# Patient Record
Sex: MALE | Race: ASIAN | HISPANIC OR LATINO | Employment: OTHER | URBAN - METROPOLITAN AREA
[De-identification: names, ages, dates, MRNs, and addresses within clinical notes are randomized per-mention and may not be internally consistent; named-entity substitution may affect disease eponyms.]

---

## 2023-08-08 ENCOUNTER — OFFICE VISIT (OUTPATIENT)
Dept: DERMATOLOGY | Facility: CLINIC | Age: 68
End: 2023-08-08
Payer: COMMERCIAL

## 2023-08-08 VITALS — BODY MASS INDEX: 24.85 KG/M2 | TEMPERATURE: 96.7 F | HEIGHT: 70 IN | WEIGHT: 173.6 LBS

## 2023-08-08 DIAGNOSIS — L20.9 ATOPIC DERMATITIS, UNSPECIFIED TYPE: Primary | ICD-10-CM

## 2023-08-08 DIAGNOSIS — L21.9 SEBORRHEIC DERMATITIS: ICD-10-CM

## 2023-08-08 PROCEDURE — 99204 OFFICE O/P NEW MOD 45 MIN: CPT | Performed by: DERMATOLOGY

## 2023-08-08 RX ORDER — KETOCONAZOLE 20 MG/ML
SHAMPOO TOPICAL
Qty: 120 ML | Refills: 4 | Status: SHIPPED | OUTPATIENT
Start: 2023-08-08 | End: 2023-08-09

## 2023-08-08 RX ORDER — METOPROLOL SUCCINATE 25 MG/1
TABLET, EXTENDED RELEASE ORAL
COMMUNITY
Start: 2023-05-18

## 2023-08-08 RX ORDER — LORATADINE 10 MG/1
10 TABLET ORAL AS NEEDED
COMMUNITY

## 2023-08-08 RX ORDER — LISINOPRIL 20 MG/1
TABLET ORAL
COMMUNITY
Start: 2023-05-18

## 2023-08-08 RX ORDER — FLUOCINONIDE TOPICAL SOLUTION USP, 0.05% 0.5 MG/ML
SOLUTION TOPICAL
Qty: 60 ML | Refills: 4 | Status: SHIPPED | OUTPATIENT
Start: 2023-08-08 | End: 2023-08-09

## 2023-08-08 RX ORDER — CICLOPIROX 1 G/100ML
SHAMPOO TOPICAL
Qty: 120 ML | Refills: 4 | Status: SHIPPED | OUTPATIENT
Start: 2023-08-08 | End: 2023-08-09

## 2023-08-08 RX ORDER — CLOBETASOL PROPIONATE 0.5 MG/G
CREAM TOPICAL
Qty: 60 G | Refills: 3 | Status: SHIPPED | OUTPATIENT
Start: 2023-08-08 | End: 2023-08-09

## 2023-08-08 NOTE — PATIENT INSTRUCTIONS
ATOPIC DERMATITIS ("childhood Eczema")    Assessment and Plan:  Based on a thorough discussion of this condition and the management approach to it (including a comprehensive discussion of the known risks, side effects and potential benefits of treatment), the patient (family) agrees to implement the following specific plan:  Continue clobetasol 0.05% cream use when flaring for areas on body not on face apply topically 2 times a day for no more than 2 weeks. Continue Hydrocortisone 2.5% cream as needed. Continue Vaseline applying daily. Recommend establishing with PCP for routine blood work. Recommend taking Claritin on a daily basis. Recommend Ophthmologist due to long issue of steroid use on the scalp. Assessment and Plan:   Atopic Dermatitis is a chronic, itchy skin condition that is very common in children but may occur at any age. It is also known as “eczema” or “atopic eczema.” It is the most common form of dermatitis. Atopic dermatitis usually occurs in people who have an “atopic tendency.”  This means they may develop any or all of these closely linked conditions: Atopic dermatitis, asthma, hay fever (allergic rhinitis), eosinophilic esophagitis, and gastroenteritis. Often these conditions run within families with a parent, child or sibling also affected. A family history of asthma, eczema or hay fever is particularly useful in diagnosing atopic dermatitis in infants. Atopic dermatitis arises because of a complex interaction of genetic and environmental factors. These include defects in skin barrier function making the skin more susceptible to irritation by soap and other contact irritants, the weather, temperature and non-specific triggers. There is also an element of immune system dysregulation that is often present. By definition, it is chronic and has a "waxing-waning" nature; flares should be expected but with good education and treatment strategies can be minimized.     Some specific tips we discussed:  Dry skin care. Using only mild cleansers (hypoallergenic and without fragrances) and fragrance free detergent (not “unscented” products which contain a masking agent); we discussed avoiding irritants/fragranced products. The importance of regular application of moisturizers daily (at least 3 times a day)  The known and theoretical side effects of steroids at length, including but not limited to atrophy of skin and increased pressure in eye (glaucoma) and clouding of the eye's lens (cataracts) if used in or around the eye for extended durations. The specific over-the-counter interventions and medications. Side effects, risks and benefits of topical and oral medications discussed. After lengthy discussion of etiology and treatment options, we decided to implement the following personalized treatment plan:      EDUCATION AS INTERVENTION! WHAT IS ATOPIC DERMATITIS? Atopic dermatitis (also called “eczema”) is a condition of the skin where the skin is dry, red, and itchy. The main function of the skin is to provide a barrier from the environment and is also the first defense of the immune system. In atopic dermatitis the skin barrier is decreased or disrupted, and the skin is easily irritated. As a result, moisture escapes the skin more easily, and environmental allergens and microbes can enter the skin more easily. Consequently, the skin's immune system is altered. If there are increased allergic type cells in the skin, the skin may become red and “hyper-excitable.” This leads to itching and a subsequent rash. WHY DO PEOPLE GET ATOPIC DERMATITIS? There is no single answer because many factors are involved. It is likely a combination of genetic makeup and environmental triggers and/or exposures. Excessive drying or sweating of the skin, Irritating soaps, dust mites, and pet dander are some of the more common triggers.   There is no blood test that can be done to confirm this diagnosis. The history and appearance of the skin is usually sufficient for a diagnosis. However, in some cases if the rash does not fit with the history or respond appropriately to treatment, a skin biopsy may be helpful. Many children do outgrow atopic dermatitis or get better; however, many continue to have sensitive skin into adulthood. Asthma and hay fever are often seen in many patients with atopic dermatitis; however, asthma flares do not necessarily occur at the same time as skin flares. PREVENTING FLARES OF ATOPIC DERMATITIS  The first step is to maintain the skin's barrier function. Keep the skin well moisturized. Avoid irritants and triggers. Use prescribed medicine when there are red or rough areas to help the skin to return to normal as quickly as possible. Try to limit scratching. If you keep the skin well moisturized, and avoid coming in contact with things you know irritate your child's skin, there will be less flares. However, some flares of atopic dermatitis are beyond your control. You should work with your health care provider to come up with a plan that minimizes flares while minimizing long term use of medications that suppress the immune system. WHAT ARE SOME OF THE TRIGGERS? Triggers are different for different people. The most common triggers are:  Heat and sweat for some individuals, cold weather for others. House dust mites, pet fur. Wool; synthetic fabrics like nylon; dyed fabrics. Tobacco smoke   Fragrances in: shampoos, soaps, lotions, laundry detergents, fabric softeners. Saliva or prolonged exposure to water. WHAT ABOUT FOOD ALLERGIES? This is a very controversial topic, as many believe that food allergies are responsible for skin flares. In some cases, specific foods may cause worsening of atopic dermatitis; however this occurs in a minority of cases and usually happens within a few hours of ingestion.  While food allergy is more common in children with eczema, foods are specific triggers for flares in only a small percentage of children. If you notice that the skin flares after certain foods you can see if eliminating one food at time makes a difference, as long as your child can still enjoy a well-balanced diet. There are blood (RAST) and skin (PRICK) tests that can check for allergies, but they are often positive in children who are not truly allergic. Therefore it is important that you work with your allergist and dermatologist to determine which foods are relevant and causing true symptoms. Extreme food elimination diets without the guidance of your doctor, which have become more popular in recent years, may even result in worsening of the skin rash due to malnutrition and avoidance of essential nutrients. TREATMENT  Treatments are aimed at minimizing exposure to irritating factors and decreasing  the skin inflammation which results in an itchy rash. There are many different treatment options, which depend on your child's rash, its location, and severity. Topical treatments include corticosteroids and steroid-like creams such as Protopic, Elidel, and Eucrisa, which are believed to not thin the skin. Please read the discussions below regarding risks and benefits of all of these creams. Occasionally bacterial or viral infections can occur which flare the skin and require oral and/or topical antibiotics or antivirals. In some cases bleach baths 2-3 times weekly can be helpful to prevent recurrent infection. For severe disease, strong oral medications such as corticosteroids, methotrexate or azathioprine (Imuran) may be needed. These medications require close monitoring and follow-up. You should discuss the risks/ benefits/alternatives of these medications with your health care provider to come up with the best treatment plan for your child. 1) Use moisturizer all over the entire body at least THREE TIMES a day.   This keeps the skin moisturized to restore the barrier function. Find a cream or ointment that your child likes - this is the most important. The medicines do not work in the bottle. The thicker the moisturizer, generally the better barrier it provides. Ointments often moisturize better than creams; and creams work better than lotions. Lotions are more useful during the summer when thick greasy ointments are uncomfortable. If you put moisturizer on the skin after bathing, while the skin is damp, it is twice as effective. The moisturizer provides a seal holding the water in the skin. You may bathe your child in warm - not hot - water, for short periods of time (no more than 5-10 minutes at a time) once a day if they like. Lightly pat your child dry with a towel and, while the skin is still damp, (within 3 minutes) apply a moisturizer from head to toe. If your child is using a medicated cream, apply it and allow it to absorb completely BEFORE you apply the moisturizer. 2) Apply the prescription medication TWICE A DAY to only the red, [place] [id number] [address]  Put the medication on your fingers and gently rub it into the areas. Usually the medicine will help an area within a few days time. Try to put the medicine on for two days after you have noticed that the redness is no longer present; this will help the redness from returning. The severity of the rash and the strength and usage of the medication will determine how quickly you see improvement. It is important that you do not overuse steroid creams, and if you notice a thin, shiny appearance to the skin or broken blood vessels, you should stop using the cream and consult your health care provider regarding possible overuse/overthinning of the skin. The face, armpits and groin have particularly thin and sensitive skin and are therefore most at risk for bad results if steroids are over-used in these sites. 3) Avoid triggers.     Some children have specific things that trigger itching and rashes, while others may have none that can be identified. It may require a little bit of trial and error to see what applies to your child. Also, triggers can change over time for your child. The most common triggers are listed above; start with these. Avoid the use of fabric softeners in the washing machine or dryer sheets (unless they are fragrance-free). Try to use laundry detergents, soaps and shampoos that are fragrance-free. You may find it helpful to double-rinse your clothes. Some children are sensitive to house dust mites and they may benefit from a plastic mattress wrap. While food allergy is more common in children with eczema, foods are specific triggers for flares in only a small percentage of children. If you notice that the skin flares after certain foods you can see if eliminating one food at time makes a difference, as long as your child can still enjoy a well-balanced diet. 4) Consider using a medication like an anti-histamine by mouth to help control the itching. Scratching only makes the skin more reactive and the barrier function even more disrupted. It can cause both children and their parents to lose sleep! There are different types of anti-itch medications. Some cause more drowsiness than others. Both types are acceptable depending on your child and your preference. Start with Benadryl and if that does not work, ask for a prescription “antihistamine.”    5) About the prescription creams:  Corticosteroid creams and ointments (generally things with "-one" or "randy" on the end of their names): The strength of the cream or ointment depends on the name of the active ingredient. The numbers at the end do not indicate the relative strength. Thus triamcinolone 0.1% ointment, considered a mid-strength corticosteroid, is much stronger than hydrocortisone 1% even though the number following the name is much lower.   Topical corticosteroids are very effective in treating atopic dermatitis. When used in the manner prescribed (to rashy areas of skin and for no more than a few weeks at a time to any one area) they are very safe. These are corticosteroids and are anti-inflammatory, not the “anabolic steroids” like those used illegally by some athletes. Topical non-steroid creams and ointments (immunomodulators): These creams and ointments are also called topical calcineurin inhibitors (TCIs). These include Protopic ointment and Elidel cream. Crisaborole 2% [email or web address]) is a prescription ointment that targets an enzyme called PDE4 (phosphodiesterase 4). It is used on the skin topically to treat mild-to-moderate eczema in adults and children 3years of age and older. In total, these nonsteroidal prescriptions are used to help decrease itching and redness in the skin. They are not as strong as most steroid creams; however, it is believed that they do not thin the skin when overused. They are generally used as second-line medications, though they may be used alone or in conjunction with topical steroids. In sensitive areas such as the face, underarms or groin, they are often recommended. They can sting inflamed skin, but are generally well tolerated once the skin is healing. The FDA placed a “black-box” warning on both Elidel and Protopic in 2006 based on animal studies using the medications. Some animals developed skin cancer and lymphoma. Subsequently, the FDA released a statement that there is no causal relationship between the two medications and cancer. Because of this concern, there are ongoing studies to evaluate this relationship in humans. So far, there are studies that support the safety of these medications.   One showed that the rates of cancer in patient using these medications topically were less than the rates of the general population and another showed that in patient's using the medication over a large area of the body, the levels of the medication in the blood was undetectable. As for Eucrisa, this product is only approved for the topical treatment of mild-to-moderate eczema in patients 3years of age and older; use of the medication in kids younger than 2 is considered “off label” and has not been formally studied. Burning and stinging are the most commonly reported side effects of this medication. Rarely, this product has been known to cause hives and hypersensitivity reactions; discontinue its use if you develop severe itching, swelling, or redness in the area of application. SEBORRHEIC DERMATITIS    Assessment and Plan:  Based on a thorough discussion of this condition and the management approach to it (including a comprehensive discussion of the known risks, side effects and potential benefits of treatment), the patient (family) agrees to implement the following specific plan:  Recommend using Ketoconazole shampoo 3 times weekly  recommend switching ciclopirox shampoo 2-3 times weekly. Continue Fluocinonide solution as needed for the scalp      Seborrheic Dermatitis   Seborrheic dermatitis is a common, chronic or relapsing form of eczema/dermatitis that mainly affects the sebaceous, gland-rich regions of the scalp, face, and trunk. There are infantile and adult forms of seborrhoeic dermatitis. It is sometimes associated with psoriasis and, in that clinical scenario, may be referred to as "sebo-psoriasis."  Seborrheic dermatitis is also known as "seborrheic eczema."  Dandruff (also called "pityriasis capitis") is an uninflamed form of seborrhoeic dermatitis. Dandruff presents as bran-like scaly patches scattered within hair-bearing areas of the scalp. In an infant, this condition may be referred to as "cradle cap."  The cause of seborrheic dermatitis is not completely understood. It is associated with proliferation of various species of the skin commensal Malassezia, in its yeast (non-pathogenic) form.  Its metabolites (such as the fatty acids oleic acid, malssezin, and indole-3-carbaldehyde) may cause an inflammatory reaction. Differences in skin barrier lipid content and function may account for individual presentations. Infantile Seborrheic Dermatitis  Infantile seborrheic dermatitis affects babies under the age of 1 months and usually resolves by 1012 months of age. Infantile seborrheic dermatitis causes "cradle cap" (diffuse, greasy scaling on scalp). The rash may spread to affect armpit and groin folds (a type of "napkin dermatitis"). There may be associated salmon-pink colored patches that may flake or peel. The rash in this case is usually not especially itchy, so the baby often appears undisturbed by the rash, even when more generalized. Adult Seborrheic Dermatitis  Adult seborrheic dermatitis tends to begin in late adolescence; prevalence is greatest in young adults and in the elderly. It is more common in males than in females. The following factors are sometimes associated with severe adult seborrheic dermatitis:  Oily skin  Familial tendency to seborrhoeic dermatitis or a family history of psoriasis  Immunosuppression: organ transplant recipient, human immunodeficiency virus (HIV) infection and patients with lymphoma  Neurological and psychiatric diseases: Parkinson disease, tardive dyskinesia, depression, epilepsy, facial nerve palsy, spinal cord injury and congenital disorders such as Down syndrome  Treatment for psoriasis with psoralen and ultraviolet A (PUVA) therapy  Lack of sleep  Stressful events. In adults, seborrheic dermatitis may typically affect the scalp, face (creases around the nose, behind ears, within eyebrows) and upper trunk. Typical clinical features include:   Winter flares, improving in summer following sun exposure  Minimal itch most of the time  Combination oily and dry mid-facial skin  Ill-defined localized scaly patches or diffuse scale in the scalp  Blepharitis; scaly red eyelid margins  [place]-pink, thin, scaly, and ill-defined plaques in skin folds on both sides of the face  Petal or ring-shaped flaky patches on hair-line and on anterior chest  Rash in armpits, under the breasts, in the groin folds and genital creases  Superficial folliculitis (inflamed hair follicles) on cheeks and upper trunk    Seborrheic dermatitis is diagnosed by its clinical appearance and behavior. Skin biopsy may be helpful but is rarely necessary to make this diagnosis.

## 2023-08-09 RX ORDER — KETOCONAZOLE 20 MG/ML
SHAMPOO TOPICAL
Qty: 120 ML | Refills: 4 | Status: SHIPPED | OUTPATIENT
Start: 2023-08-09

## 2023-08-09 RX ORDER — CLOBETASOL PROPIONATE 0.5 MG/G
CREAM TOPICAL
Qty: 60 G | Refills: 3 | Status: SHIPPED | OUTPATIENT
Start: 2023-08-09

## 2023-08-09 RX ORDER — CICLOPIROX 1 G/100ML
SHAMPOO TOPICAL
Qty: 120 ML | Refills: 4 | Status: SHIPPED | OUTPATIENT
Start: 2023-08-09

## 2023-08-09 RX ORDER — FLUOCINONIDE TOPICAL SOLUTION USP, 0.05% 0.5 MG/ML
SOLUTION TOPICAL
Qty: 60 ML | Refills: 4 | Status: SHIPPED | OUTPATIENT
Start: 2023-08-09

## 2023-09-25 NOTE — PROGRESS NOTES
[facility] Note     Patient Name: Digna Chavez  Encounter Date: 8/8/23     Have you been cared for by a [facility] Dermatologist in the last 3 years and, if so, which description applies to you? NO. I am considered a "new" patient and must complete all patient intake questions. I am MALE/not capable of bearing children. REVIEW OF SYSTEMS:  Have you recently had or currently have any of the following? Recent fever or chills? No  Any non-healing wound? No   PAST MEDICAL HISTORY:  Have you personally ever had or currently have any of the following? If "YES," then please provide more detail. Skin cancer (such as Melanoma, Basal Cell Carcinoma, Squamous Cell Carcinoma? No  Tuberculosis, HIV/AIDS, Hepatitis B or C: No  Systemic Immunosuppression such as Diabetes, Biologic or Immunotherapy, Chemotherapy, Organ Transplantation, Bone Marrow Transplantation YES, chemotherapy  Radiation Treatment No   FAMILY HISTORY:  Any "first degree relatives" (parent, brother, sister, or child) with the following? Skin Cancer, Pancreatic or Other Cancer? YES, mother pancreatic cancer   PATIENT EXPERIENCE:    Do you want the Dermatologist to perform a COMPLETE skin exam today including a clinical examination under the "bra and underwear" areas? NO  If necessary, do we have your permission to call and leave a detailed message on your Preferred Phone number that includes your specific medical information? Yes      No Known Allergies   Current Outpatient Medications:   •  Ciclopirox 1 % shampoo, Alternate usage every other week with ketoconazole shampoo. Use 2-3 days a week:  Lather into scalp; leave on for 5 minutes and then rinse off completely. , Disp: 120 mL, Rfl: 4  •  clobetasol (TEMOVATE) 0.05 % cream, use when flaring for areas on body not on face - apply topically 2 times a day for no more than 2 weeks straight, Disp: 60 g, Rfl: 3  •  fluocinonide (LIDEX) 0.05 % external solution, Continue Fluocinonide solution as needed for the scalp, Disp: 60 mL, Rfl: 4  •  hydrocortisone 2.5 % cream, Apply topically 2 (two) times a day, Disp: 20 g, Rfl: 3  •  ketoconazole (NIZORAL) 2 % shampoo, Alternate usage every other week with ciclopirox shampoo. Use 2-3 days a week:  Lather into scalp; leave on for 5 minutes and then rinse off completely. , Disp: 120 mL, Rfl: 4  •  lisinopril (ZESTRIL) 20 mg tablet, , Disp: , Rfl:   •  loratadine (CLARITIN) 10 mg tablet, Take 10 mg by mouth as needed for allergies, Disp: , Rfl:   •  metoprolol succinate (TOPROL-XL) 25 mg 24 hr tablet, , Disp: , Rfl:           Whom besides the patient is providing clinical information about today's encounter? NO ADDITIONAL HISTORIAN (patient alone provided history)    Physical Exam and Assessment/Plan by Diagnosis:    ATOPIC DERMATITIS ("Eczema")    Physical Exam:  Anatomic Location Affected:  Scalp, thumbs  Morphological Description:  Mild erythema  Body Surface Area Today:  2%  Overall Severity: mild  Pertinent Positives:  Pertinent Negatives: Additional History of Present Condition:  Started 2017, itching on the scalp , shins, elbows, shoulders, thumbs. Using clobetasol cream, hydrocortisone cream. Patch testing food allergy testing performed previously with no significant results per patient. He is not sure if he has had a biopsy performed in the past.     Assessment and Plan:  Based on a thorough discussion of this condition and the management approach to it (including a comprehensive discussion of the known risks, side effects and potential benefits of treatment), the patient (family) agrees to implement the following specific plan:  Continue clobetasol 0.05% cream use when flaring for areas on body not on face apply topically 2 times a day for no more than 2 weeks. Continue Hydrocortisone 2.5% cream as needed. Continue Vaseline applying daily. Recommend establishing with PCP for routine blood work.   Recommend switching from Take 1 tablet by mouth 2 times daily for 30 days. Max Daily Amount: 10 mg  Dispense: 60 tablet; Refill: 0        Return in about 3 months (around 12/25/2023). taking Claritin as needed to Claritin on a daily basis. SEBORRHEIC DERMATITIS    Physical Exam:  Anatomic Location Affected:  Scalp   Morphological Description:  Mild erythema present today  Pertinent Positives:  Pertinent Negatives: Additional History of Present Condition:  using ketoconazole 2% shampoo and fluocinonide solution which helps with controlling symptoms. Assessment and Plan:  Based on a thorough discussion of this condition and the management approach to it (including a comprehensive discussion of the known risks, side effects and potential benefits of treatment), the patient (family) agrees to implement the following specific plan:  Recommend using Ketoconazole shampoo 3 times weekly  On a weekly basis, alternate ketoconazole shampoo with clopirox shampoo 2-3 times weekly. Continue Fluocinonide solution as needed for the scalp  Recommend Ophthmologist evaluation due to long history of steroid use on the scalp.          Scribe Attestation    I,:  Ab Encarnacion am acting as a scribe while in the presence of the attending physician.:       I,:  Rae Sacks, MD personally performed the services described in this documentation    as scribed in my presence.:         Geoff Portillo MD  Dermatology, PGY-3

## 2024-02-06 ENCOUNTER — APPOINTMENT (OUTPATIENT)
Dept: URGENT CARE | Age: 69
End: 2024-02-06